# Patient Record
Sex: FEMALE | Race: WHITE
[De-identification: names, ages, dates, MRNs, and addresses within clinical notes are randomized per-mention and may not be internally consistent; named-entity substitution may affect disease eponyms.]

---

## 2019-12-29 ENCOUNTER — HOSPITAL ENCOUNTER (EMERGENCY)
Dept: HOSPITAL 8 - ED | Age: 49
LOS: 1 days | Discharge: HOME | End: 2019-12-30
Payer: COMMERCIAL

## 2019-12-29 VITALS — WEIGHT: 121.7 LBS | HEIGHT: 62 IN | BODY MASS INDEX: 22.39 KG/M2

## 2019-12-29 DIAGNOSIS — G43.009: Primary | ICD-10-CM

## 2019-12-29 DIAGNOSIS — Z87.891: ICD-10-CM

## 2019-12-29 DIAGNOSIS — I10: ICD-10-CM

## 2019-12-29 LAB
ALBUMIN SERPL-MCNC: 3.6 G/DL (ref 3.4–5)
ALP SERPL-CCNC: 114 U/L (ref 45–117)
ALT SERPL-CCNC: 17 U/L (ref 12–78)
ANION GAP SERPL CALC-SCNC: 6 MMOL/L (ref 5–15)
BASOPHILS # BLD AUTO: 0.04 X10^3/UL (ref 0–0.1)
BASOPHILS NFR BLD AUTO: 1 % (ref 0–1)
BILIRUB SERPL-MCNC: 0.3 MG/DL (ref 0.2–1)
CALCIUM SERPL-MCNC: 8.9 MG/DL (ref 8.5–10.1)
CHLORIDE SERPL-SCNC: 110 MMOL/L (ref 98–107)
CREAT SERPL-MCNC: 0.87 MG/DL (ref 0.55–1.02)
EOSINOPHIL # BLD AUTO: 0.11 X10^3/UL (ref 0–0.4)
EOSINOPHIL NFR BLD AUTO: 2 % (ref 1–7)
ERYTHROCYTE [DISTWIDTH] IN BLOOD BY AUTOMATED COUNT: 16 % (ref 9.6–15.2)
LYMPHOCYTES # BLD AUTO: 1.31 X10^3/UL (ref 1–3.4)
LYMPHOCYTES NFR BLD AUTO: 19 % (ref 22–44)
MCH RBC QN AUTO: 24.4 PG (ref 27–34.8)
MCHC RBC AUTO-ENTMCNC: 31.7 G/DL (ref 32.4–35.8)
MCV RBC AUTO: 76.9 FL (ref 80–100)
MD: NO
MONOCYTES # BLD AUTO: 0.34 X10^3/UL (ref 0.2–0.8)
MONOCYTES NFR BLD AUTO: 5 % (ref 2–9)
NEUTROPHILS # BLD AUTO: 5.19 X10^3/UL (ref 1.8–6.8)
NEUTROPHILS NFR BLD AUTO: 74 % (ref 42–75)
PLATELET # BLD AUTO: 322 X10^3/UL (ref 130–400)
PMV BLD AUTO: 7.7 FL (ref 7.4–10.4)
PROT SERPL-MCNC: 6.7 G/DL (ref 6.4–8.2)
RBC # BLD AUTO: 4.56 X10^6/UL (ref 3.82–5.3)
TROPONIN I SERPL-MCNC: < 0.015 NG/ML (ref 0–0.04)

## 2019-12-29 PROCEDURE — 80053 COMPREHEN METABOLIC PANEL: CPT

## 2019-12-29 PROCEDURE — 96374 THER/PROPH/DIAG INJ IV PUSH: CPT

## 2019-12-29 PROCEDURE — 36415 COLL VENOUS BLD VENIPUNCTURE: CPT

## 2019-12-29 PROCEDURE — 96375 TX/PRO/DX INJ NEW DRUG ADDON: CPT

## 2019-12-29 PROCEDURE — 93005 ELECTROCARDIOGRAM TRACING: CPT

## 2019-12-29 PROCEDURE — 99284 EMERGENCY DEPT VISIT MOD MDM: CPT

## 2019-12-29 PROCEDURE — 84484 ASSAY OF TROPONIN QUANT: CPT

## 2019-12-29 PROCEDURE — 85025 COMPLETE CBC W/AUTO DIFF WBC: CPT

## 2019-12-30 VITALS — DIASTOLIC BLOOD PRESSURE: 95 MMHG | SYSTOLIC BLOOD PRESSURE: 150 MMHG

## 2019-12-30 NOTE — NUR
RN to bedside, informed of need for peripheral iv for medications. As RN 
started iv physician returned to bedside to update on results. RN started iv 
per protocol and administered medications. RN returned an hour later, to check 
on patient. Patient vitals within normal limits but still lethargic from 
medications. Awaiting to become more arousable to discharge patient.

## 2019-12-30 NOTE — NUR
RN to bedside, still sleeping soundly. Attempted to awake. Patient stated "yes" 
and then presumed to fall back asleep. Still lethargic after medications. Still 
questionably safe to discharge. Will discharge when more arousable.

## 2019-12-30 NOTE — NUR
RN again to bedside, still very lethargic from previous medications. Awaiting 
further arousal for patient discharge

## 2023-12-21 ENCOUNTER — HOSPITAL ENCOUNTER (EMERGENCY)
Facility: MEDICAL CENTER | Age: 53
End: 2023-12-22
Attending: STUDENT IN AN ORGANIZED HEALTH CARE EDUCATION/TRAINING PROGRAM

## 2023-12-21 ENCOUNTER — APPOINTMENT (OUTPATIENT)
Dept: RADIOLOGY | Facility: MEDICAL CENTER | Age: 53
End: 2023-12-21

## 2023-12-21 DIAGNOSIS — R07.9 CHEST PAIN, UNSPECIFIED TYPE: ICD-10-CM

## 2023-12-21 DIAGNOSIS — J44.1 ACUTE EXACERBATION OF CHRONIC OBSTRUCTIVE PULMONARY DISEASE (COPD) (HCC): ICD-10-CM

## 2023-12-21 PROCEDURE — 93005 ELECTROCARDIOGRAM TRACING: CPT

## 2023-12-21 PROCEDURE — 80053 COMPREHEN METABOLIC PANEL: CPT

## 2023-12-21 PROCEDURE — 84703 CHORIONIC GONADOTROPIN ASSAY: CPT

## 2023-12-21 PROCEDURE — 85027 COMPLETE CBC AUTOMATED: CPT

## 2023-12-21 PROCEDURE — 36415 COLL VENOUS BLD VENIPUNCTURE: CPT

## 2023-12-21 PROCEDURE — 85007 BL SMEAR W/DIFF WBC COUNT: CPT

## 2023-12-21 PROCEDURE — 84484 ASSAY OF TROPONIN QUANT: CPT

## 2023-12-21 PROCEDURE — 93005 ELECTROCARDIOGRAM TRACING: CPT | Performed by: STUDENT IN AN ORGANIZED HEALTH CARE EDUCATION/TRAINING PROGRAM

## 2023-12-21 PROCEDURE — 85379 FIBRIN DEGRADATION QUANT: CPT

## 2023-12-21 PROCEDURE — 99285 EMERGENCY DEPT VISIT HI MDM: CPT

## 2023-12-22 ENCOUNTER — HOSPITAL ENCOUNTER (OUTPATIENT)
Dept: RADIOLOGY | Facility: MEDICAL CENTER | Age: 53
End: 2023-12-22

## 2023-12-22 VITALS
BODY MASS INDEX: 22.26 KG/M2 | TEMPERATURE: 100.3 F | WEIGHT: 121 LBS | HEIGHT: 62 IN | HEART RATE: 117 BPM | DIASTOLIC BLOOD PRESSURE: 71 MMHG | RESPIRATION RATE: 19 BRPM | OXYGEN SATURATION: 91 % | SYSTOLIC BLOOD PRESSURE: 115 MMHG

## 2023-12-22 LAB
ALBUMIN SERPL BCP-MCNC: 4.7 G/DL (ref 3.2–4.9)
ALBUMIN/GLOB SERPL: 1.7 G/DL
ALP SERPL-CCNC: 161 U/L (ref 30–99)
ALT SERPL-CCNC: 22 U/L (ref 2–50)
ANION GAP SERPL CALC-SCNC: 13 MMOL/L (ref 7–16)
ANISOCYTOSIS BLD QL SMEAR: ABNORMAL
AST SERPL-CCNC: 22 U/L (ref 12–45)
BASOPHILS # BLD AUTO: 0 % (ref 0–1.8)
BASOPHILS # BLD: 0 K/UL (ref 0–0.12)
BILIRUB SERPL-MCNC: 0.6 MG/DL (ref 0.1–1.5)
BUN SERPL-MCNC: 12 MG/DL (ref 8–22)
CALCIUM ALBUM COR SERPL-MCNC: 9.3 MG/DL (ref 8.5–10.5)
CALCIUM SERPL-MCNC: 9.9 MG/DL (ref 8.5–10.5)
CHLORIDE SERPL-SCNC: 102 MMOL/L (ref 96–112)
CO2 SERPL-SCNC: 24 MMOL/L (ref 20–33)
CREAT SERPL-MCNC: 0.65 MG/DL (ref 0.5–1.4)
D DIMER PPP IA.FEU-MCNC: <0.27 UG/ML (FEU) (ref 0–0.5)
EKG IMPRESSION: NORMAL
EOSINOPHIL # BLD AUTO: 0 K/UL (ref 0–0.51)
EOSINOPHIL NFR BLD: 0 % (ref 0–6.9)
ERYTHROCYTE [DISTWIDTH] IN BLOOD BY AUTOMATED COUNT: 42.7 FL (ref 35.9–50)
GFR SERPLBLD CREATININE-BSD FMLA CKD-EPI: 105 ML/MIN/1.73 M 2
GLOBULIN SER CALC-MCNC: 2.7 G/DL (ref 1.9–3.5)
GLUCOSE SERPL-MCNC: 148 MG/DL (ref 65–99)
HCG SERPL QL: NEGATIVE
HCT VFR BLD AUTO: 49.7 % (ref 37–47)
HGB BLD-MCNC: 16.4 G/DL (ref 12–16)
LYMPHOCYTES # BLD AUTO: 0.57 K/UL (ref 1–4.8)
LYMPHOCYTES NFR BLD: 5.2 % (ref 22–41)
MANUAL DIFF BLD: NORMAL
MCH RBC QN AUTO: 30 PG (ref 27–33)
MCHC RBC AUTO-ENTMCNC: 33 G/DL (ref 32.2–35.5)
MCV RBC AUTO: 91 FL (ref 81.4–97.8)
MICROCYTES BLD QL SMEAR: ABNORMAL
MONOCYTES # BLD AUTO: 0.48 K/UL (ref 0–0.85)
MONOCYTES NFR BLD AUTO: 4.4 % (ref 0–13.4)
MORPHOLOGY BLD-IMP: NORMAL
NEUTROPHILS # BLD AUTO: 9.94 K/UL (ref 1.82–7.42)
NEUTROPHILS NFR BLD: 90.4 % (ref 44–72)
NRBC # BLD AUTO: 0 K/UL
NRBC BLD-RTO: 0 /100 WBC (ref 0–0.2)
PLATELET # BLD AUTO: 202 K/UL (ref 164–446)
PLATELET BLD QL SMEAR: NORMAL
PMV BLD AUTO: 9.1 FL (ref 9–12.9)
POTASSIUM SERPL-SCNC: 3.9 MMOL/L (ref 3.6–5.5)
PROT SERPL-MCNC: 7.4 G/DL (ref 6–8.2)
RBC # BLD AUTO: 5.46 M/UL (ref 4.2–5.4)
RBC BLD AUTO: PRESENT
SODIUM SERPL-SCNC: 139 MMOL/L (ref 135–145)
TROPONIN T SERPL-MCNC: <6 NG/L (ref 6–19)
TROPONIN T SERPL-MCNC: <6 NG/L (ref 6–19)
WBC # BLD AUTO: 11 K/UL (ref 4.8–10.8)

## 2023-12-22 PROCEDURE — 71045 X-RAY EXAM CHEST 1 VIEW: CPT

## 2023-12-22 PROCEDURE — 700111 HCHG RX REV CODE 636 W/ 250 OVERRIDE (IP): Performed by: STUDENT IN AN ORGANIZED HEALTH CARE EDUCATION/TRAINING PROGRAM

## 2023-12-22 PROCEDURE — 96375 TX/PRO/DX INJ NEW DRUG ADDON: CPT

## 2023-12-22 PROCEDURE — 84484 ASSAY OF TROPONIN QUANT: CPT

## 2023-12-22 PROCEDURE — 94640 AIRWAY INHALATION TREATMENT: CPT

## 2023-12-22 PROCEDURE — 700105 HCHG RX REV CODE 258: Performed by: STUDENT IN AN ORGANIZED HEALTH CARE EDUCATION/TRAINING PROGRAM

## 2023-12-22 PROCEDURE — 96374 THER/PROPH/DIAG INJ IV PUSH: CPT

## 2023-12-22 PROCEDURE — 700101 HCHG RX REV CODE 250: Performed by: STUDENT IN AN ORGANIZED HEALTH CARE EDUCATION/TRAINING PROGRAM

## 2023-12-22 PROCEDURE — 36415 COLL VENOUS BLD VENIPUNCTURE: CPT

## 2023-12-22 RX ORDER — SODIUM CHLORIDE 9 MG/ML
1000 INJECTION, SOLUTION INTRAVENOUS ONCE
Status: COMPLETED | OUTPATIENT
Start: 2023-12-22 | End: 2023-12-22

## 2023-12-22 RX ORDER — IPRATROPIUM BROMIDE AND ALBUTEROL SULFATE 2.5; .5 MG/3ML; MG/3ML
3 SOLUTION RESPIRATORY (INHALATION) ONCE
Status: COMPLETED | OUTPATIENT
Start: 2023-12-22 | End: 2023-12-22

## 2023-12-22 RX ORDER — KETOROLAC TROMETHAMINE 30 MG/ML
15 INJECTION, SOLUTION INTRAMUSCULAR; INTRAVENOUS ONCE
Status: COMPLETED | OUTPATIENT
Start: 2023-12-22 | End: 2023-12-22

## 2023-12-22 RX ORDER — DOXYCYCLINE HYCLATE 100 MG
100 TABLET ORAL 2 TIMES DAILY
Qty: 14 TABLET | Refills: 0 | Status: ACTIVE | OUTPATIENT
Start: 2023-12-22

## 2023-12-22 RX ORDER — PREDNISONE 50 MG/1
50 TABLET ORAL DAILY
Qty: 5 TABLET | Refills: 0 | Status: SHIPPED | OUTPATIENT
Start: 2023-12-22

## 2023-12-22 RX ORDER — MORPHINE SULFATE 4 MG/ML
4 INJECTION INTRAVENOUS ONCE
Status: COMPLETED | OUTPATIENT
Start: 2023-12-22 | End: 2023-12-22

## 2023-12-22 RX ORDER — ACETAMINOPHEN 500 MG
500-1000 TABLET ORAL EVERY 6 HOURS PRN
Qty: 30 TABLET | Refills: 0 | Status: SHIPPED | OUTPATIENT
Start: 2023-12-22

## 2023-12-22 RX ORDER — ALBUTEROL SULFATE 2.5 MG/3ML
2.5 SOLUTION RESPIRATORY (INHALATION) EVERY 4 HOURS PRN
Qty: 30 EACH | Refills: 0 | Status: SHIPPED | OUTPATIENT
Start: 2023-12-22

## 2023-12-22 RX ORDER — IBUPROFEN 600 MG/1
600 TABLET ORAL EVERY 6 HOURS PRN
Qty: 30 TABLET | Refills: 0 | Status: SHIPPED | OUTPATIENT
Start: 2023-12-22

## 2023-12-22 RX ADMIN — MORPHINE SULFATE 4 MG: 4 INJECTION, SOLUTION INTRAMUSCULAR; INTRAVENOUS at 00:14

## 2023-12-22 RX ADMIN — IPRATROPIUM BROMIDE AND ALBUTEROL SULFATE 3 ML: 2.5; .5 SOLUTION RESPIRATORY (INHALATION) at 03:50

## 2023-12-22 RX ADMIN — KETOROLAC TROMETHAMINE 15 MG: 30 INJECTION, SOLUTION INTRAMUSCULAR; INTRAVENOUS at 03:55

## 2023-12-22 RX ADMIN — SODIUM CHLORIDE 1000 ML: 9 INJECTION, SOLUTION INTRAVENOUS at 03:54

## 2023-12-22 ASSESSMENT — HEART SCORE
TROPONIN: LESS THAN OR EQUAL TO NORMAL LIMIT
AGE: 45-64
RISK FACTORS: 1-2 RISK FACTORS
HEART SCORE: 3
ECG: NON-SPECIFIC REPOLARIZATION DISTURBANCE
HISTORY: SLIGHTLY SUSPICIOUS

## 2023-12-22 ASSESSMENT — FIBROSIS 4 INDEX: FIB4 SCORE: 1.23

## 2023-12-22 NOTE — ED NOTES
Bedside report received by GREGORIO Monk    Oxygen:2 [facility]  Fall Risk status: call light within reach, bed in lowest position/locked  Patient Mentation:A+O x 4  Continuous cardiac monitoring: NSR  Visitor at bedside

## 2023-12-22 NOTE — ED NOTES
Pt provided DC paperwork and understands prescription pick-up, IV removed, pt provided incentive spirometer to take home, pt wheeled to ER lobby for DC

## 2023-12-22 NOTE — ED PROVIDER NOTES
CHIEF COMPLAINT  Chief Complaint   Patient presents with    Chest Pain       LIMITATION TO HISTORY   Select: None    HPI    Kalli Brothers is a 53 y.o. female who presents to the Emergency Department for evaluation of chest pain.  Patient states approximately 4 hours ago she was sitting in bed reading when she developed a sharp right-sided chest pain that seem to be worse with inspiration and movement, and breathing.  States that as she feels it is difficult to take a deep breath due to the pain though denies any shortness of breath cough hemoptysis.  No ripping tearing back pain numbness tingling weakness extremities.  No history of DVT PE.  Denies any prior cardiac history.  Denies any trauma or falls.  Does admit to associated shortness of breath, does have a prior smoking history denies any formal diagnosis of COPD.    OUTSIDE HISTORIAN(S):  Select: none    EXTERNAL RECORDS REVIEWED  Select: Other no pertinent records for review      PAST MEDICAL HISTORY  No past medical history on file.  .    SURGICAL HISTORY  No past surgical history on file.      FAMILY HISTORY  No family history on file.       SOCIAL HISTORY  Social History     Socioeconomic History    Marital status:      Spouse name: Not on file    Number of children: Not on file    Years of education: Not on file    Highest education level: Not on file   Occupational History    Not on file   Tobacco Use    Smoking status: Not on file    Smokeless tobacco: Not on file   Substance and Sexual Activity    Alcohol use: Not on file    Drug use: Not on file    Sexual activity: Not on file   Other Topics Concern    Not on file   Social History Narrative    Not on file     Social Determinants of Health     Financial Resource Strain: Not on file   Food Insecurity: Not on file   Transportation Needs: Not on file   Physical Activity: Not on file   Stress: Not on file   Social Connections: Not on file   Intimate Partner Violence: Not on file   Housing Stability:  "Not on file         CURRENT MEDICATIONS  No current facility-administered medications on file prior to encounter.     No current outpatient medications on file prior to encounter.           ALLERGIES  Allergies   Allergen Reactions    Hydrocodone-Acetaminophen Vomiting    Meperidine Swelling     Itchy, red skin       PHYSICAL EXAM  VITAL SIGNS:/71   Pulse (!) 117   Temp 37.9 °C (100.3 °F) (Temporal)   Resp 19   Ht 1.575 m (5' 2\")   Wt 54.9 kg (121 lb)   SpO2 91%   BMI 22.13 kg/m²       VITALS - vital signs documented prior to this note have been reviewed and noted,  GENERAL - awake, alert, oriented, GCS 15, no apparent distress, non-toxic  appearing  HEENT - normocephalic, atraumatic, pupils equal, sclera anicteric, mucus  membranes moist  NECK - supple, no meningismus, full active range of motion, trachea midline  CARDIOVASCULAR - regular rate/rhythm, no murmurs/gallops/rubs  PULMONARY - no respiratory distress, speaking in full sentences, and expiratory wheezing  GASTROINTESTINAL - soft, non-tender, non-distended, no rebound, guarding,  or peritonitis  GENITOURINARY - Deferred  NEUROLOGIC - Awake alert, normal mental status, speech fluid, cognition  normal, moves all extremities  MUSCULOSKELETAL - no obvious asymmetry or deformities present  EXTREMITIES - warm, well-perfused, no cyanosis or significant edema  DERMATOLOGIC - warm, dry, no rashes, no jaundice  PSYCHIATRIC - normal affect, normal insight, normal concentration    DIAGNOSTIC STUDIES / PROCEDURES  EKG  I have independently interpreted this EKG  Interpreted below by myself as no acute ischemic changes no STEMI pattern     Report   Date Value Ref Range Status   2023       [facility] Emergency Dept.    Test Date:  2023  Pt Name:    FLO OBREGON                   Department: ER  MRN:        [id number]                      Room:  Gender:     Female                       Technician: [id number]  :        1970         "           Requested By:ER TRIAGE PROTOCOL  Order #:    [id number]                    Reading MD: Matthias Keyes    Measurements  Intervals                                Axis  Rate:       97                           P:          70  NM:         180                          QRS:        -65  QRSD:       92                           T:          61  QT:         350  QTc:        445    Interpretive Statements  Sinus rhythm  LAE, consider biatrial enlargement  Left anterior fascicular block  RSR' in V1 or V2, probably normal variant  ST elev, probable normal early repol pattern  No previous ECG available for comparison  Electronically Signed On 12- 00:01:29 PST by Matthias Keyes                LABS  Labs Reviewed   CBC WITH DIFFERENTIAL - Abnormal; Notable for the following components:       Result Value    WBC 11.0 (*)     RBC 5.46 (*)     Hemoglobin 16.4 (*)     Hematocrit 49.7 (*)     Neutrophils-Polys 90.40 (*)     Lymphocytes 5.20 (*)     Neutrophils (Absolute) 9.94 (*)     Lymphs (Absolute) 0.57 (*)     All other components within normal limits    Narrative:     Biotin intake of greater than 5 mg per day may interfere with                  troponin levels, causing false low values.   COMP METABOLIC PANEL - Abnormal; Notable for the following components:    Glucose 148 (*)     Alkaline Phosphatase 161 (*)     All other components within normal limits    Narrative:     Biotin intake of greater than 5 mg per day may interfere with                  troponin levels, causing false low values.   TROPONIN    Narrative:     Biotin intake of greater than 5 mg per day may interfere with                  troponin levels, causing false low values.   TROPONIN    Narrative:     Biotin intake of greater than 5 mg per day may interfere with                  troponin levels, causing false low values.   HCG QUAL SERUM    Narrative:     Biotin intake of greater than 5 mg per day may interfere with                  troponin  levels, causing false low values.   D-DIMER   ESTIMATED GFR    Narrative:     Biotin intake of greater than 5 mg per day may interfere with                  troponin levels, causing false low values.   DIFFERENTIAL MANUAL    Narrative:     Biotin intake of greater than 5 mg per day may interfere with                  troponin levels, causing false low values.   PERIPHERAL SMEAR REVIEW    Narrative:     Biotin intake of greater than 5 mg per day may interfere with                  troponin levels, causing false low values.   PLATELET ESTIMATE    Narrative:     Biotin intake of greater than 5 mg per day may interfere with                  troponin levels, causing false low values.   MORPHOLOGY    Narrative:     Biotin intake of greater than 5 mg per day may interfere with                  troponin levels, causing false low values.       D-dimer is negative serial troponins are negative EKG is nonischemic,\  RADIOLOGY  I have independently interpreted the diagnostic imaging associated with this visit and am waiting the final reading from the radiologist.   My preliminary interpretation is as follows: No evidence of pneumonia      Radiologist interpretation:   DX-CHEST-PORTABLE (1 VIEW)   Final Result         1.  Interstitial pulmonary parenchymal prominence suggest chronic underlying lung disease, component of interstitial edema and/or infiltrates not excluded.           COURSE & MEDICAL DECISION MAKING    ED COURSE:    ED Observation Status? Yes;I am placing the patient in to an observation status due to a diagnostic uncertainty as well as therapeutic intensity. Patient placed in observation status at 12:03 AM 12/21/2023    Observation plan is as follows: Serial troponins evaluation to response to DuoNeb treatment    INTERVENTIONS BY ME:  Medications   morphine 4 MG/ML injection 4 mg (4 mg Intravenous Given 12/22/23 0014)   ipratropium-albuterol (DUONEB) nebulizer solution (3 mL Nebulization Given 12/22/23 3378)    ketorolac (Toradol) injection 15 mg (15 mg Intravenous Given 12/22/23 1850)   NS (Bolus) 0.9 % infusion 1,000 mL (0 mL Intravenous Stopped 12/22/23 5926)       Response on recheck: Reevaluation at 0512 patient was ambulatory has saturating 91 to 93% on room air, no respiratory distress, wheezing has resolved.      Patient discharged from ED observation at 5:11 AM 12/22/23  .      @HYDRATION: Based on the patient's presentation of Tachycardia the patient was given IV fluids. IV Hydration was used because oral hydration was not adequate alone. Upon recheck following hydration, the patient was improved.@    INITIAL ASSESSMENT, COURSE AND PLAN  Care Narrative: Patient presented for evaluation of right-sided chest pain with associated shortness of breath.  On examination did have some expiratory wheezing.  Chest x-ray was ordered, laboratory studies were initiated, she was treated with morphine for her pain.  Serial troponins are negative her EKG is nonischemic, heart score is 3 doubt mace at this time.  Given that tachycardia with associated pleuritic chest pain PE was a consideration fortunately D-dimer is negative tachycardia did improve after IV fluids.  Chest x-ray does findings concerning for COPD, patient does have a longstanding smoking history, given her wheezing, may be a component of an underlying COPD exacerbation contributing to her shortness of breath thus we will treat the patient with steroids as well as doxycycline.  Prior to discharge patient was awake alert pain had improved, she is ambulatory saturating 91 to 94% on room air thus I considered discharge reasonable.  Will place referral to establish a PCP return precautions were discussed at length and she was discharged in stable condition.           ADDITIONAL PROBLEM LIST    DISPOSITION AND DISCUSSIONS    Escalation of care considered, and ultimately not performed:acute inpatient care management, however at this time, the patient is most  appropriate for outpatient management considered admission though the patient is ambulatory with O2 saturation of 91 to 93%, serial troponins have been negative, D-dimer is negative, do believe she is appropriate for outpatient follow-up and instructed to follow and schedule appointment with a PCP outpatient PCP referral was placed    Barriers to care at this time, including but not limited to: Patient does not have established PCP.     Decision tools and prescription drugs considered including, but not limited to: HEART Score 3 .    FINAL DIAGNOSIS  1. Chest pain, unspecified type Acute   2. Acute exacerbation of chronic obstructive pulmonary disease (COPD) ([facility])             Electronically signed by: Matthias Keyes DO ,5:11 AM 12/22/2

## 2023-12-22 NOTE — ED TRIAGE NOTES
Chief Complaint   Patient presents with    Chest Pain   Patient brought in by  on wheel chair with complaint of central chest pain radiating to right arm, worsen when change position. Denies any shortness of breath, diaphoresis, fever or cough. Patient endorses she had alcohol today, last drink 3 hours ago, denies any drug use.     Patient AAO X4, respirations even and unlabored on room air, afebrile.     Pt made aware of triage process, placed back into lobby, educated pt to tell staff of any worsening of symptoms     BP (!) 148/99   Pulse (!) 102   Temp 37.7 °C (99.9 °F) (Temporal)   Resp 16   SpO2 98%

## 2023-12-22 NOTE — DISCHARGE INSTRUCTIONS
If you develop any fevers difficulty breathing, return for recheck take scheduled Tylenol ibuprofen for your pain, use the albuterol as needed for shortness of breath and take the antibiotics until they are gone